# Patient Record
(demographics unavailable — no encounter records)

---

## 2025-01-27 NOTE — REASON FOR VISIT
Patient : Nick Nicholas Age: 48 year old Sex: male   MRN: [id number] Encounter Date: 3/5/2019    E08/08    History     Chief Complaint   Patient presents with   • Shortness of Breath     HPI  3/5/2019  11:49 AM Nick Nicholas is a 48 year old male with a h/o COPD and asthma who presents to the ED accompanied by his friend via private vehicle for evaluation of SOB that began a few days ago. This morning he had a productive cough of \"milky sputum\" which was followed by worsening SOB with an SpO2 of 85 % on room air. He then placed himself on 3L O2 NC instead of his baseline of 2.5 L and also self treated with two breathing treatments without improvement. He also reports he was dyspneic upon ambulating to the bathroom. He called the nurse's hotline who then referred him to the ED. He notes feeling febrile with and hot and cold diaphoretic episodes at night. The patient also reports a steady chest pressure secondary to cough and a \"raw throat.\" He used Dayquil yesterday and liquid sudafed this morning. The patient denies rhinorrhea, leg swelling and any other sx. He states a PMH of anxiety and takes XANAX and Adderall, although did not take his prescribed Adderall today. There are no further complaints or modifying factors at this time.    PCP: No Pcp    Allergies   Allergen Reactions   • Penicillins HIVES, SHORTNESS OF BREATH and Other (See Comments)     \"Itchy throat\" per pt   • Vicodin [Hydrocodone-Acetaminophen] PRURITUS       Current Discharge Medication List      Prior to Admission Medications    Details   albuterol (VENTOLIN) (2.5 MG/3ML) 0.083% nebulizer solution Take 3 mLs by nebulization every 6 hours as needed for Wheezing.  Qty: 375 mL, Refills: 12      FLUoxetine (PROZAC) 20 MG capsule Take 2 capsules by mouth every morning.  Qty: 60 capsule, Refills: 2      prazosin (MINIPRESS) 1 MG capsule Take 1 capsule by mouth at bedtime.  Qty: 30 capsule, Refills: 2      gabapentin (NEURONTIN) 300 MG capsule Take 1  capsule by mouth 3 times daily.  Qty: 90 capsule, Refills: 3      tiZANidine (ZANAFLEX) 4 MG tablet Take 1 tablet by mouth at bedtime.  Qty: 30 tablet, Refills: 3      ALPRAZolam (XANAX) 1 MG tablet Take 1 tablet by mouth 3 times daily.  Qty: 90 tablet, Refills: 2      lisdexamfetamine (VYVANSE) 70 MG capsule Take 1 capsule by mouth every morning.  Qty: 30 capsule, Refills: 0      amphetamine-dextroamphetamine (ADDERALL) 10 MG tablet Take 1 tablet by mouth every morning 11am AND 1 tablet every evening 3pm  Qty: 60 tablet, Refills: 0      predniSONE (DELTASONE) 10 MG tablet Take 1 tablet by mouth daily.  Qty: 28 tablet, Refills: 0      varenicline (CHANTIX CONTINUING MONTH GABRIEL) 1 MG tablet Take 1 tablet by mouth 2 times daily.  Qty: 56 tablet, Refills: 1      FLUoxetine (PROZAC) 20 MG capsule Take 2 capsules by mouth every morning.  Qty: 60 capsule, Refills: 2      montelukast (SINGULAIR) 10 MG tablet Take 1 tablet by mouth daily.  Qty: 30 tablet, Refills: 5      gabapentin (NEURONTIN) 300 MG capsule Take 1 capsule by mouth 3 times daily.  Qty: 90 capsule, Refills: 3      tiZANidine (ZANAFLEX) 4 MG tablet Take 1 tablet by mouth at bedtime.  Qty: 30 tablet, Refills: 3      prazosin (MINIPRESS) 1 MG capsule Take 1 capsule by mouth at bedtime.  Qty: 30 capsule, Refills: 2      lisinopril (PRINIVIL,ZESTRIL) 40 MG tablet Take 1 tablet by mouth daily.  Qty: 90 tablet, Refills: 3      albuterol-ipratropium (COMBIVENT RESPIMAT) 100-20 MCG/ACT inhaler Inhale 1 puff into the lungs every 4 hours as needed for Shortness of Breath.  Qty: 4 g, Refills: 11      EPINEPHrine (EPIPEN 2-GABRIEL) 0.3 MG/0.3ML auto-injector Inject 0.3 mLs into the muscle 1 time as needed for Anaphylaxis.  Qty: 2 each, Refills: 3      OMALizumab (XOLAIR) 150 MG injection Inject 300 mg into the skin every 28 days.      beclomethasone diprop HFA (QVAR REDIHALER) 80 MCG/ACT inhaler Inhale 1 puff into the lungs 2 times daily.  Qty: 10.6 g, Refills: 3       albuterol (PROAIR HFA) 108 (90 Base) MCG/ACT inhaler Inhale 2 puffs into the lungs every 4 hours as needed for Shortness of Breath or Wheezing.  Qty: 8.5 g, Refills: 11      pregabalin (LYRICA) 75 MG capsule Take 1 capsule by mouth 2 times daily.  Qty: 60 capsule, Refills: 0      EPINEPHrine 0.3 MG/0.3ML auto-injector Inject 0.3 mLs into the muscle as needed (allergic reaction).  Qty: 2 each, Refills: 0      QUEtiapine (SEROQUEL) 50 MG tablet Take 1 tablet by mouth 3 times daily.  Qty: 90 tablet, Refills: 1      omeprazole (PRILOSEC) 40 MG capsule Take 1 capsule by mouth daily. Take half hour before first meal of day  Qty: 30 capsule, Refills: 11             Past Medical History:   Diagnosis Date   • Allergy    • Anxiety    • Bronchitis    • Chronic pain    • COPD (chronic obstructive pulmonary disease) (CMS/[facility]) 2L O2 A@H    Followed by Dr Tristen Monroy [phone] [facility]   • Depression    • Drug abuse NEC, unspec    • Esophageal reflux    • Essential (primary) hypertension    • Fracture    • Moderate persistent asthma CPAP 6-15cm HCM    Followed by Dr Tristen Monroy [facility] [phone]   • Nocturnal hypoxia     Followed by Dr Tristen Monroy [facility] [phone]   • Personal history of traumatic fracture        Past Surgical History:   Procedure Laterality Date   • ABDOMEN SURGERY     • ABDOMINAL WALL SURGERY      ruputure splean   • FINGER SURGERY      right small finger,    • KNEE SURGERY     • TRACHEAL SURGERY      trach in        Family History   Problem Relation Age of Onset   • Arthritis Mother          in her 60s   • Cancer Mother    • Diabetes Mother    • Stroke Mother    • Heart disease Mother         cabg   • Other Mother         fibromyalgia   • Asthma Son    • Arthritis Maternal Aunt    • Cancer Maternal Aunt    • Heart disease Maternal Aunt    • Diabetes Maternal Aunt    • Arthritis  Maternal Uncle    • Cancer Maternal Uncle    • Diabetes Maternal Uncle    • Stroke Maternal Uncle    • Heart disease Maternal Uncle    • Cancer Maternal Aunt    • Arthritis Maternal Aunt    • Diabetes Maternal Aunt    • Heart disease Maternal Aunt        Social History     Tobacco Use   • Smoking status: Former Smoker     Packs/day: 0.25     Years: 27.00     Pack years: 6.75     Types: Cigarettes     Start date: 1/1/1980   • Smokeless tobacco: Never Used   Substance Use Topics   • Alcohol use: Yes     Alcohol/week: 1.2 oz     Types: 2 Standard drinks or equivalent per week     Comment: Socially, once every other other weeks   • Drug use: Yes     Types: Marijuana     Comment: occasionally       Review of Systems   Constitutional: Positive for chills (subjective), diaphoresis (worse at night) and fever (subjective). Negative for activity change.   HENT: Negative for congestion, facial swelling and rhinorrhea.    Eyes: Negative for discharge and redness.   Respiratory: Positive for cough (productive) and shortness of breath. Negative for wheezing.    Cardiovascular: Positive for chest pain (steady pressure). Negative for leg swelling.   Gastrointestinal: Negative for nausea and vomiting.   Genitourinary: Negative for decreased urine volume and dysuria.   Musculoskeletal: Negative.  Negative for gait problem and joint swelling.   Skin: Negative.  Negative for color change and rash.   Neurological: Negative.  Negative for tremors and speech difficulty.   Psychiatric/Behavioral: Negative.  Negative for agitation and confusion.   All other systems reviewed and are negative.      Physical Exam     ED Triage Vitals [03/05/19 1201]   ED Triage Vitals Group      Temp 99.1 °F (37.3 °C)      Pulse 138      Resp 25      /84      SpO2 92 %      EtCO2 mmHg       Height 6' 1\" (1.854 m)      Weight 184 lb 1.4 oz (83.5 kg)      Weight Scale Used ED Actual       Physical Exam   Constitutional: He is oriented to person, place, and  time. He appears well-developed and well-nourished. He is cooperative.  Non-toxic appearance. No distress.   HENT:   Head: Normocephalic and atraumatic.   Right Ear: External ear normal.   Left Ear: External ear normal.   Nose: Nose normal.   Mouth/Throat: Mucous membranes are normal.   Eyes: Conjunctivae, EOM and lids are normal.   Neck: Normal range of motion. No neck rigidity.   Cardiovascular: Regular rhythm and normal heart sounds. Tachycardia present.   No murmur heard.  Pulmonary/Chest: Effort normal. Tachypnea noted. No respiratory distress. He has wheezes (coarse) in the right upper field, the right middle field, the right lower field, the left upper field, the left middle field and the left lower field.   The pt has shallow breathing in all lung fields.  He has a frequent congested cough present.   Abdominal: Normal appearance. He exhibits no distension.   Musculoskeletal: Normal range of motion. He exhibits no edema.   The BLE are non-edematous.   Neurological: He is alert and oriented to person, place, and time. No cranial nerve deficit. Coordination normal.   Skin: Skin is warm and dry. No rash noted. No pallor.   Psychiatric: He has a normal mood and affect. His speech is normal and behavior is normal. Judgment and thought content normal. Cognition and memory are normal.   Nursing note and vitals reviewed.      ED Course     Procedures    Lab Results     Results for orders placed or performed during the hospital encounter of 03/05/19   Comprehensive Metabolic Panel   Result Value Ref Range    Sodium 138 135 - 145 mmol/L    Potassium 4.3 3.4 - 5.1 mmol/L    Chloride 102 98 - 107 mmol/L    Carbon Dioxide 29 21 - 32 mmol/L    Anion Gap 11 10 - 20 mmol/L    Glucose 146 (H) 65 - 99 mg/dL    BUN 18 6 - 20 mg/dL    Creatinine 0.97 0.67 - 1.17 mg/dL    GFR Estimate,  >90     GFR Estimate, Non African American >90     BUN/Creatinine Ratio 19 7 - 25    CALCIUM 9.0 8.4 - 10.2 mg/dL    TOTAL  BILIRUBIN 0.7 0.2 - 1.0 mg/dL    AST/SGOT 25 <38 Units/L    ALT/SGPT 35 <79 Units/L    ALK PHOSPHATASE 94 45 - 117 Units/L    TOTAL PROTEIN 7.3 6.4 - 8.2 g/dL    Albumin 3.7 3.6 - 5.1 g/dL    GLOBULIN 3.6 2.0 - 4.0 g/dL    A/G Ratio, Serum 1.0 1.0 - 2.4   CBC & Auto Differential   Result Value Ref Range    WBC 27.2 (H) 4.2 - 11.0 K/mcL    RBC 4.33 (L) 4.50 - 5.90 mil/mcL    HGB 13.4 13.0 - 17.0 g/dL    HCT 41.4 39.0 - 51.0 %    MCV 95.6 78.0 - 100.0 fl    MCH 30.9 26.0 - 34.0 pg    MCHC 32.4 32.0 - 36.5 g/dL    RDW-CV 12.4 11.0 - 15.0 %     140 - 450 K/mcL    NRBC 0 0 /100 WBC    DIFF TYPE AUTOMATED DIFFERENTIAL     Neutrophil 90 %    LYMPH 2 %    MONO 7 %    EOSIN 0 %    BASO 0 %    Percent Immature Granuloctyes 1 %    Absolute Neutrophil 24.2 (H) 1.8 - 7.7 K/mcL    Absolute Lymph 0.6 (L) 1.0 - 4.8 K/mcL    Absolute Mono 2.0 (H) 0.3 - 0.9 K/mcL    Absolute Eos 0.1 0.1 - 0.5 K/mcL    Absolute Baso 0.1 0.0 - 0.3 K/mcL    Absolute Immature Granulocytes 0.2 0 - 0.2 K/mcl   Chem 8 Panel - Point of Care   Result Value Ref Range    Sodium  135 - 145 mmol/L    Potassium POC 4.2 3.4 - 5.1 mmol/L    Chloride POC 99 98 - 107 mmol/L    CALCIUM IONIZED-POC 1.13 (L) 1.15 - 1.29 mmol/L    CO2 Total 29 (H) 19 - 24 mmol/L    GLUCOSE  (H) 65 - 99 mg/dL    BUN POC 17 6 - 20 mg/dL    HEMATOCRIT POC 42.0 39.0 - 51.0 %    Hemoglobin POC 14.3 13.0 - 17.0 g/dL    ANION GAP POC 15 mmol/L    Creatinine POC 1.00 0.67 - 1.17 mg/dL    Estimated GFR  (POC) >90     Estimated GFR Non- (POC) 89    Lactic Acid Venous - Point of Care   Result Value Ref Range    Lactic Acid Venous 1.2 <2.0 mmol/L   Blood Gas Venous - Point of Care   Result Value Ref Range    PH Venous POC 7.43 7.35 - 7.45 Units    PCO2 Venous 45 41 - 54 mm Hg    PO2 Venous 65 (H) 35 - 42 mm Hg    HCO3 Venous 30 (H) 22 - 28 mmol/L    Base Excess Venous 4 (H) 0 - 2 mmol/L    O2 SAT Venous 93 (H) 60 - 80 %   Troponin I - Point of Care    Result Value Ref Range    Troponin I POC <0.10 <0.10 ng/mL       EKG Results     EKG Interpretation  Rate: 137 bpm  Rhythm: sinus tachycardia   Abnormality: otherwise None  When compared with ECG of 11-APR-2017  No significant change was found.    EKG interpreted by ED physician    Radiology Results     Imaging Results          XR CHEST AP OR PA - PORTABLE (Final result)  Result time 03/05/19 12:30:30    Final result                 Impression:    IMPRESSION:  No radiographic evidence of acute cardiopulmonary disease. No  change since prior.               Narrative:    PORTABLE CHEST    HISTORY: Shortness of breath    COMPARISON: October 18, 2017.    TECHNIQUE: Portable AP upright chest X-ray is submitted.    FINDINGS: The lungs are clear of infiltrate. No pleural effusion. No  pneumothorax. The cardiac size is normal. The pulmonary vasculature is  normal. EKG monitoring leads obscure portions of the chest.                                    ED Medication Orders (From admission, onward)    Start Ordered     Status Ordering Provider    03/05/19 1259 03/05/19 1258  ketorolac injection 15 mg  ONCE      Last MAR action:  Given DESJARLAISKAREN V    03/05/19 1259 03/05/19 1258  haloperidol (HALDOL) 5 MG/ML injection 2.5 mg  ONCE      Last MAR action:  Given DESJARLAIS, ANIKE V    03/05/19 1159 03/05/19 1158  albuterol-ipratropium 2.5 mg/0.5 mg (DUONEB) nebulizer solution 3 mL  ONCE      Last MAR action:  Given DESJARLAISANIKE V    03/05/19 1159 03/05/19 1158  albuterol (VENTOLIN) nebulizer 12.5 mg  ONCE      Last MAR action:  Given DESJARLAISKAREN V    03/05/19 1159 03/05/19 1158  methylPREDNISolone (solu-MEDROL) PF injection 125 mg  ONCE      Last MAR action:  Given DESJARLAISANIKE V    03/05/19 1159 03/05/19 1158  LORazepam (ATIVAN) injection 1 mg  ONCE      Last MAR action:  Given DESJARLAISANIKE V    03/05/19 1159 03/05/19 1158  sodium chloride (NORMAL SALINE) 0.9 % bolus 1,000 mL  ONCE      Last MAR action:   Completed KAREN LUNA               [facility]  Vitals  Vitals:    03/05/19 1201 03/05/19 1330 03/05/19 1338 03/05/19 1342   BP: 135/84 121/57     Pulse: 138  108 107   Resp: 25  22    Temp: 99.1 °F (37.3 °C)      TempSrc: Oral      SpO2: 92%  91% 91%   Weight: 83.5 kg      Height: 6' 1\" (1.854 m)          ED Course  11:55 AM Initial Impression: Nick Nicholas is a 48 year old male with a hx of COPD and asthma who presents to the ED for evaluation of SOB that began yesterday and worsened this morning with an SpO2 of 85 % on room air. 3L O2 NC was placed which improved him to the low 90's. Associated sx of the chief complaint include steady chest pressure secondary to cough, dyspnea upon exertion, and throat discomfort. On exam he has shallow breathing and coarse wheezes through out all lung fields with a congested cough present. At rest his HR is 140 bpm, BP was 135/84 mm Hg, and his oral temp is 99.1 F. After the initial evaluation, we discussed the plan for a breathing tx and a CXR to r/o pneumonia.    12:34 PM: ED Tech Update: Per the ED Tech, the pt's lactate is 1.2.    12:53 PM Recheck: I rechecked the pt who is resting in bed comfortably with oximask in place. His SpO2 is in the low 90's and family is at bedside. He reports diffuse thoracic pain secondary to cough. I updated the pt on his lab results that demonstrate leukocytosis which he further supports by stating he takes Prednisone daily. The pt also reports since his tracheostomy tube placement he has had difficulty/ trouble and swallowing and was told he may have a stricture. We discussed the plan for continued assessment in the ED and if his presentation improves, to consult GI regarding a further plan of care. The pt agrees to the plan and all questions were answered at this time.    12:57 PM: RN Update: Per the RN, the pt requests medications for pain. Appropriate pain medications have been ordered.    1:30 PM Recheck: I rechecked the pt who reports  his coughing has improved. HR is in the 110's, his BP is 121/57 mm Hg, and his Sp O2 is 92 % on 3 L O2 NC. I updated the pt on his CXR results that are unremarkable for pneumonia. He denies a hx of substance abuse and states allergies to codeine and Vicodin. We discussed the plan for self care home with an Antitussive to treat for his cough. Pt instructed to return to the ED for any new or worrisome sx and to follow up with PCP. The pt agrees to the plan and all questions were answered at this time.      Pharmacy of Choice: [facility] [address] [place] [address]      [facility]  Critical Care time spent on this patient outside of billable procedures:  38 minutes    Clinical Impression  ED Diagnoses        Final diagnoses    Upper respiratory tract infection, unspecified type          COPD exacerbation (CMS/[facility])          Leukocytosis, unspecified type                The patient was provided with a recommendation to follow up with a primary care provider and obtain reassessment of his/her blood pressure within three months.    Follow Up:  Tristen Monroy MD  [address] [place] [address] [phone]    Schedule an appointment as soon as possible for a visit  for pulmonology follow up          Summary of your Discharge Medications      Take these Medications      Details   benzonatate 200 MG capsule  Commonly known as:  TESSALON PERLES   Take 1 capsule by mouth 3 times daily as needed for Cough.        ASK your doctor about these medications      Details   * predniSONE 10 MG tablet  Commonly known as:  DELTASONE  Start taking on:  2/11/2019  Ask about: Should I take this medication?   Take 4 tablets by mouth daily for 4 days, THEN 2 tablets daily for 4 days, THEN 1 tablet daily for 4 days THEN stop.     * predniSONE 10 MG tablet  Commonly known as:  DELTASONE  Ask about: Which instructions should I use?   Take 1 tablet by mouth daily.  Comment:  40mg DAILY X 4 DAYS, THEN 20mg DAILY  X 4 DAYS,  THEN 10mg DAILY UNTIL GONE     * predniSONE 20 MG tablet  Commonly known as:  DELTASONE  Ask about: Which instructions should I use?   Take 3 tablets by mouth daily.         * This list has 3 medication(s) that are the same as other medications prescribed for you. Read the directions carefully, and ask your doctor or other care provider to review them with you.                Pt is discharged to home/self care in stable condition.       I have reviewed the information recorded by the scribe for accuracy and agree with its contents.    ____________________________________________________________________    Steve Vogt acting as a scribe for Sundeep Moya PA-C.    Sundeep Moya PA-C  Dictation # [id number]  Scribe: Steve Vogt    Attending Physician: Dr. Sylvester Reyes  Dictation # [id number]         Sundeep MCCULLOUGH PA-C  03/05/19 1810     [Initial Evaluation] : an initial evaluation of

## 2025-01-27 NOTE — PHYSICAL EXAM
[General Appearance - Well Developed] : well developed [General Appearance - Well Nourished] : well nourished [Heart Rate And Rhythm] : heart rate and rhythm were normal [Heart Sounds] : normal S1 and S2 [Murmurs] : no murmurs present [Arterial Pulses Normal] : the arterial pulses were normal [Respiration, Rhythm And Depth] : normal respiratory rhythm and effort [Auscultation Breath Sounds / Voice Sounds] : lungs were clear to auscultation bilaterally [Bowel Sounds] : normal bowel sounds [Abdomen Tenderness] : non-tender [Nail Clubbing] : no clubbing of the fingernails [Petechial Hemorrhages (___cm)] : no petechial hemorrhages [Skin Turgor] : normal skin turgor [] : no rash [Oriented To Time, Place, And Person] : oriented to person, place, and time [Impaired Insight] : insight and judgment were intact

## 2025-01-28 NOTE — ASSESSMENT
[FreeTextEntry1] : this is a 62 year old with strong family hx of Carotid disease ( bilateral), mother passed of cerebral aneurysm. She is here today to establish care  # BANG with climbing stairs.  #chest pain  # Hyperlipidemia  7/18/24 , , HDL 73 TG 89 # family hx of Carotid stenosis (71) # family hx of cerebral aneurysm ( 67)   plan repeat labs / lipids/ lipoa/ cmp/ a1c check CTA neck and head for hx of carotid stenosis and r/o cerebral aneurysm  check CCTA with metoprolol 12 hour x 1 day and morning of.  rtc6-8  months after testing done

## 2025-01-28 NOTE — HISTORY OF PRESENT ILLNESS
[FreeTextEntry1] : This is a 62 year old pmh borderline elevated cholesterol. She has a family hx of carotid stenosis ( father bilateral CEA). She has no known medical history and takes a baby aspirin for overall health. She admit that is she climbs stairs briskly she is dyspnic on exertion. admits to rare episodes of chest pain with associated indigestion.    ==== data reviewed today=== EKG sr with [facility]  [facility]  7/18/24 , , HDL 73 TG 89 GFR 81 TSH  1.01 A1c 5.1

## 2025-01-28 NOTE — REVIEW OF SYSTEMS
[Fever] : no fever [SOB] : no shortness of breath [Dyspnea on exertion] : not dyspnea during exertion [Chest Discomfort] : no chest discomfort [Leg Claudication] : no intermittent leg claudication [Palpitations] : no palpitations [Orthopnea] : no orthopnea [Cough] : no cough [Abdominal Pain] : no abdominal pain [Nausea] : no nausea [Change in Appetite] : no change in appetite [Joint Pain] : no joint pain [Joint Swelling] : no joint swelling [Joint Stiffness] : no joint stiffness [Rash] : no rash [Dizziness] : no dizziness [Convulsions] : no convulsions [Confusion] : no confusion was observed [Memory Lapses Or Loss] : no memory lapses or loss